# Patient Record
Sex: MALE | Race: OTHER | Employment: UNEMPLOYED | URBAN - NONMETROPOLITAN AREA
[De-identification: names, ages, dates, MRNs, and addresses within clinical notes are randomized per-mention and may not be internally consistent; named-entity substitution may affect disease eponyms.]

---

## 2017-10-14 ENCOUNTER — HOSPITAL ENCOUNTER (EMERGENCY)
Age: 37
Discharge: ANOTHER HOSPITAL | End: 2017-10-14
Attending: EMERGENCY MEDICINE
Payer: COMMERCIAL

## 2017-10-14 ENCOUNTER — HOSPITAL ENCOUNTER (INPATIENT)
Age: 37
LOS: 19 days | Discharge: PSYCHIATRIC OR SUBSTANCE USE FACILITY | DRG: 750 | End: 2017-11-02
Attending: PSYCHIATRY & NEUROLOGY | Admitting: PSYCHIATRY & NEUROLOGY
Payer: COMMERCIAL

## 2017-10-14 VITALS
SYSTOLIC BLOOD PRESSURE: 119 MMHG | RESPIRATION RATE: 16 BRPM | OXYGEN SATURATION: 99 % | DIASTOLIC BLOOD PRESSURE: 55 MMHG | TEMPERATURE: 98 F | HEART RATE: 80 BPM

## 2017-10-14 DIAGNOSIS — F20.0 PARANOID SCHIZOPHRENIA (HCC): Primary | ICD-10-CM

## 2017-10-14 LAB
-: NORMAL
ABSOLUTE EOS #: 0.1 K/UL (ref 0–0.4)
ABSOLUTE LYMPH #: 2.2 K/UL (ref 1–4.8)
ABSOLUTE MONO #: 1.1 K/UL (ref 0–1)
AMORPHOUS: NORMAL
AMPHETAMINE SCREEN URINE: NEGATIVE
ANION GAP SERPL CALCULATED.3IONS-SCNC: 11 MMOL/L (ref 9–17)
BACTERIA: NORMAL
BARBITURATE SCREEN URINE: NEGATIVE
BASOPHILS # BLD: 1 %
BASOPHILS ABSOLUTE: 0.1 K/UL (ref 0–0.2)
BENZODIAZEPINE SCREEN, URINE: NEGATIVE
BILIRUBIN URINE: NEGATIVE
BUN BLDV-MCNC: 7 MG/DL (ref 6–20)
BUN/CREAT BLD: 7 (ref 9–20)
BUPRENORPHINE URINE: NORMAL
CALCIUM SERPL-MCNC: 9.8 MG/DL (ref 8.6–10.4)
CANNABINOID SCREEN URINE: NEGATIVE
CASTS UA: NORMAL /LPF
CHLORIDE BLD-SCNC: 100 MMOL/L (ref 98–107)
CO2: 29 MMOL/L (ref 20–31)
COCAINE METABOLITE, URINE: NEGATIVE
COLOR: YELLOW
COMMENT UA: ABNORMAL
CREAT SERPL-MCNC: 1.06 MG/DL (ref 0.7–1.2)
CRYSTALS, UA: NORMAL /HPF
DIFFERENTIAL TYPE: YES
EKG ATRIAL RATE: 86 BPM
EKG ATRIAL RATE: 89 BPM
EKG P AXIS: 77 DEGREES
EKG P AXIS: 77 DEGREES
EKG P-R INTERVAL: 158 MS
EKG P-R INTERVAL: 162 MS
EKG Q-T INTERVAL: 358 MS
EKG Q-T INTERVAL: 362 MS
EKG QRS DURATION: 106 MS
EKG QRS DURATION: 96 MS
EKG QTC CALCULATION (BAZETT): 433 MS
EKG QTC CALCULATION (BAZETT): 435 MS
EKG R AXIS: 79 DEGREES
EKG R AXIS: 81 DEGREES
EKG T AXIS: 46 DEGREES
EKG T AXIS: 69 DEGREES
EKG VENTRICULAR RATE: 86 BPM
EKG VENTRICULAR RATE: 89 BPM
EOSINOPHILS RELATIVE PERCENT: 1 %
EPITHELIAL CELLS UA: NORMAL /HPF
ETHANOL PERCENT: <0.01 %
ETHANOL: <10 MG/DL
GFR AFRICAN AMERICAN: >60 ML/MIN
GFR NON-AFRICAN AMERICAN: >60 ML/MIN
GFR SERPL CREATININE-BSD FRML MDRD: ABNORMAL ML/MIN/{1.73_M2}
GFR SERPL CREATININE-BSD FRML MDRD: ABNORMAL ML/MIN/{1.73_M2}
GLUCOSE BLD-MCNC: 113 MG/DL (ref 70–99)
GLUCOSE URINE: NEGATIVE
HCT VFR BLD CALC: 45.8 % (ref 41–53)
HEMOGLOBIN: 15 G/DL (ref 13.5–17.5)
KETONES, URINE: ABNORMAL
LEUKOCYTE ESTERASE, URINE: NEGATIVE
LYMPHOCYTES # BLD: 20 %
MCH RBC QN AUTO: 28 PG (ref 26–34)
MCHC RBC AUTO-ENTMCNC: 32.7 G/DL (ref 31–37)
MCV RBC AUTO: 85.5 FL (ref 80–100)
MDMA URINE: NORMAL
METHADONE SCREEN, URINE: NEGATIVE
METHAMPHETAMINE, URINE: NEGATIVE
MONOCYTES # BLD: 10 %
MUCUS: NORMAL
NITRITE, URINE: NEGATIVE
OPIATES, URINE: NEGATIVE
OTHER OBSERVATIONS UA: NORMAL
OXYCODONE SCREEN URINE: NEGATIVE
PDW BLD-RTO: 12.9 % (ref 12.1–15.2)
PH UA: 6.5 (ref 5–8)
PHENCYCLIDINE, URINE: NEGATIVE
PLATELET # BLD: 293 K/UL (ref 140–450)
PLATELET ESTIMATE: ABNORMAL
PMV BLD AUTO: ABNORMAL FL (ref 6–12)
POTASSIUM SERPL-SCNC: 3.7 MMOL/L (ref 3.7–5.3)
PROPOXYPHENE, URINE: NEGATIVE
PROTEIN UA: NEGATIVE
RBC # BLD: 5.36 M/UL (ref 4.5–5.9)
RBC # BLD: ABNORMAL 10*6/UL
RBC UA: NORMAL /HPF (ref 0–2)
RENAL EPITHELIAL, UA: NORMAL /HPF
SEG NEUTROPHILS: 68 %
SEGMENTED NEUTROPHILS ABSOLUTE COUNT: 7.5 K/UL (ref 2.1–6.5)
SODIUM BLD-SCNC: 140 MMOL/L (ref 135–144)
SPECIFIC GRAVITY UA: 1.01 (ref 1–1.03)
TEST INFORMATION: NORMAL
TRICHOMONAS: NORMAL
TRICYCLIC ANTIDEPRESSANTS, UR: NEGATIVE
TURBIDITY: CLEAR
URINE HGB: NEGATIVE
UROBILINOGEN, URINE: ABNORMAL
WBC # BLD: 11 K/UL (ref 3.5–11)
WBC # BLD: ABNORMAL 10*3/UL
WBC UA: NORMAL /HPF
YEAST: NORMAL

## 2017-10-14 PROCEDURE — 80048 BASIC METABOLIC PNL TOTAL CA: CPT

## 2017-10-14 PROCEDURE — 99285 EMERGENCY DEPT VISIT HI MDM: CPT

## 2017-10-14 PROCEDURE — G0480 DRUG TEST DEF 1-7 CLASSES: HCPCS

## 2017-10-14 PROCEDURE — 93005 ELECTROCARDIOGRAM TRACING: CPT

## 2017-10-14 PROCEDURE — 81001 URINALYSIS AUTO W/SCOPE: CPT

## 2017-10-14 PROCEDURE — 1240000000 HC EMOTIONAL WELLNESS R&B

## 2017-10-14 PROCEDURE — 36415 COLL VENOUS BLD VENIPUNCTURE: CPT

## 2017-10-14 PROCEDURE — 6370000000 HC RX 637 (ALT 250 FOR IP): Performed by: PSYCHIATRY & NEUROLOGY

## 2017-10-14 PROCEDURE — 85025 COMPLETE CBC W/AUTO DIFF WBC: CPT

## 2017-10-14 PROCEDURE — 80306 DRUG TEST PRSMV INSTRMNT: CPT

## 2017-10-14 RX ORDER — HALOPERIDOL 5 MG
10 TABLET ORAL EVERY 4 HOURS PRN
Status: DISCONTINUED | OUTPATIENT
Start: 2017-10-14 | End: 2017-11-02 | Stop reason: HOSPADM

## 2017-10-14 RX ORDER — LORAZEPAM 2 MG/ML
1 INJECTION INTRAMUSCULAR ONCE
Status: DISCONTINUED | OUTPATIENT
Start: 2017-10-14 | End: 2017-10-14 | Stop reason: HOSPADM

## 2017-10-14 RX ORDER — HALOPERIDOL 5 MG/ML
10 INJECTION INTRAMUSCULAR EVERY 4 HOURS PRN
Status: DISCONTINUED | OUTPATIENT
Start: 2017-10-14 | End: 2017-11-02 | Stop reason: HOSPADM

## 2017-10-14 RX ORDER — MAGNESIUM HYDROXIDE/ALUMINUM HYDROXICE/SIMETHICONE 120; 1200; 1200 MG/30ML; MG/30ML; MG/30ML
30 SUSPENSION ORAL 3 TIMES DAILY PRN
Status: DISCONTINUED | OUTPATIENT
Start: 2017-10-14 | End: 2017-11-02 | Stop reason: HOSPADM

## 2017-10-14 RX ORDER — HYDROXYZINE HYDROCHLORIDE 25 MG/1
25 TABLET, FILM COATED ORAL 3 TIMES DAILY PRN
Status: DISCONTINUED | OUTPATIENT
Start: 2017-10-14 | End: 2017-11-02 | Stop reason: HOSPADM

## 2017-10-14 RX ORDER — TRAZODONE HYDROCHLORIDE 50 MG/1
50 TABLET ORAL NIGHTLY PRN
Status: DISCONTINUED | OUTPATIENT
Start: 2017-10-14 | End: 2017-11-02 | Stop reason: HOSPADM

## 2017-10-14 RX ORDER — BENZTROPINE MESYLATE 1 MG/ML
2 INJECTION INTRAMUSCULAR; INTRAVENOUS DAILY PRN
Status: DISCONTINUED | OUTPATIENT
Start: 2017-10-14 | End: 2017-11-02 | Stop reason: HOSPADM

## 2017-10-14 RX ORDER — ACETAMINOPHEN 325 MG/1
650 TABLET ORAL EVERY 4 HOURS PRN
Status: DISCONTINUED | OUTPATIENT
Start: 2017-10-14 | End: 2017-11-02 | Stop reason: HOSPADM

## 2017-10-14 RX ADMIN — HALOPERIDOL 10 MG: 5 TABLET ORAL at 21:00

## 2017-10-14 ASSESSMENT — SLEEP AND FATIGUE QUESTIONNAIRES
DO YOU HAVE DIFFICULTY SLEEPING: COMMENT
DO YOU USE A SLEEP AID: NO

## 2017-10-14 ASSESSMENT — ENCOUNTER SYMPTOMS
ABDOMINAL PAIN: 0
HYPERVENTILATION: 0

## 2017-10-14 ASSESSMENT — LIFESTYLE VARIABLES: HISTORY_ALCOHOL_USE: NO

## 2017-10-14 NOTE — ED NOTES
Roman Cosme from [facility] informs staff that patient has been accepted at [facility]. ETA for transportation is 1800.        Kay Rowan RN  10/14/17 2945

## 2017-10-14 NOTE — ED PROVIDER NOTES
eMERGENCY dEPARTMENT eNCOUnter        [facility]    Chief Complaint   Patient presents with   [address] Problem     pt. was brought in by [facility] PD after neighbors called stating pt. was screaming and yelling in his apartment. Pt. exhibiting hallucinations and paranoia. HPI    Sinan Tee is a 31 y.o. male who presents with hallucinations and Paranoia. Patient was brought in to the ER by [facility]. after neighbors called stating the patient was screaming and yelling in his apartment. REVIEW OF SYSTEMS    All systems reviewed and positives are in the history of present illness. PAST MEDICAL HISTORY    No past medical history on file. SURGICAL HISTORY    No past surgical history on file. CURRENT MEDICATIONS        ALLERGIES    No Known Allergies    FAMILY HISTORY    No family history on file. SOCIAL HISTORY    Social History     Social History    Marital status: Single     Spouse name: N/A    Number of children: N/A    Years of education: N/A     Social History Main Topics    Smoking status: Not on file    Smokeless tobacco: Not on file    Alcohol use Not on file    Drug use: Unknown    Sexual activity: Not on file     Other Topics Concern    Not on file     Social History Narrative    No narrative on file       PHYSICAL EXAM    VITAL SIGNS: BP (!) 119/55   Pulse 80   Temp 98 °F (36.7 °C) (Oral)   Resp 16   SpO2 99%   Constitutional:  Well developed, well nourished, Moderate distress due to anxiety and agitation. , non-toxic appearance   Eyes:  PERRL, conjunctiva normal   HENT:  Atraumatic, external ears normal, nose normal, oropharynx moist.  Dental decay present. Neck- supple   Respiratory:  No respiratory distress, normal breath sounds. Cardiovascular:  Normal rate, normal rhythm, no murmurs, no gallops, no rubs   GI:  Soft, nondistended, normal bowel sounds, nontender, no organomegaly.   Musculoskeletal:  No edema, no tenderness, no deformities. Back- no tenderness   Integument:  Well hydrated   Neurologic:  Grossly intact  Psychiatric:  Patient is very anxious and agitated    EKG    EKG with no acute changes. RADIOLOGY/PROCEDURES        ED COURSE & MEDICAL DECISION MAKING    Pertinent Labs & Imaging studies reviewed. (See chart for details)  Josseline saab evaluated patient in the ER, and [facility] will be transferred to [facility]. Dr. Caroline Fajardo accepted transfer. FINAL IMPRESSION    1. Schitzophrenia  2. Summation      Patient Course: 23 Lila Mack evaluated patient, and patient will be transferred to rescue at [facility]. Dr. Caroline Fajardo accepted transfer. ED Medications administered this visit:    Medications   LORazepam (ATIVAN) injection 1 mg (1 mg Intravenous Not Given 10/14/17 0524)       New Prescriptions from this visit:    New Prescriptions    No medications on file       Follow-up:  No follow-up provider specified. Final Impression:   1.  Paranoid schizophrenia ([facility] [address]               (Please note that portions of this note were completed with a voice recognition program.  Efforts were made to edit the dictations but occasionally words are mis-transcribed.)       Burgess Isac MD  10/14/17 9326

## 2017-10-14 NOTE — ED NOTES
Pt. States that he is has been to [facility] unit recently. Pt. States that he was given an Rx but did not fill it because he does not need it. Pt. States that he has the ability to predict future events before they happen. Pt. States that he is not crazy and that staff does not understand that \"just because he talks to people we can't see does not mean that they aren't there. Pt. States that we will not inject him with anything or take his urine because then he will be admitted. \" Pt. States that he would rather be taken to alf then kept at the hospital against his will.         Naif Rojas RN  10/14/17 5084

## 2017-10-14 NOTE — ED PROVIDER NOTES
The history is provided by the patient, the EMS personnel and the police. Mental Health Problem   Presenting symptoms: agitation, bizarre behavior and hallucinations    Presenting symptoms: no aggressive behavior, no delusions, no depression, no disorganized speech, no disorganized thought process, no homicidal ideas, no paranoid behavior, no self-mutilation, no suicidal thoughts, no suicidal threats and no suicide attempt    Patient accompanied by:  Law enforcement  Degree of incapacity (severity): Moderate  Onset quality:  Sudden  Duration:  1 hour  Timing:  Constant  Progression:  Unchanged  Chronicity:  New  Context: not alcohol use, not drug abuse, not medication, not noncompliant, not recent medication change and not stressful life event    Treatment compliance:  Untreated  Relieved by:  Nothing  Worsened by:  Nothing  Ineffective treatments:  None tried  Associated symptoms: no abdominal pain, no anhedonia, no anxiety, no appetite change, no chest pain, no decreased need for sleep, not distractible, no euphoric mood, no fatigue, no feelings of worthlessness, no headaches, no hypersomnia, no hyperventilation, no insomnia, no irritability, no poor judgment, no school problems and no weight change    Risk factors: hx of mental illness    Risk factors: no family hx of mental illness, no family violence, no hx of suicide attempts, no neurological disease and no recent psychiatric admission        Review of Systems   Unable to perform ROS: Psychiatric disorder   Constitutional: Negative for appetite change, fatigue and irritability. Cardiovascular: Negative for chest pain. Gastrointestinal: Negative for abdominal pain. Neurological: Negative for headaches. Psychiatric/Behavioral: Positive for agitation and hallucinations. Negative for homicidal ideas, paranoia, self-injury and suicidal ideas. The patient is not nervous/anxious and does not have insomnia.     All other systems reviewed and are negative. Physical Exam   Constitutional: He is oriented to person, place, and time. He appears well-developed and well-nourished. No distress. HENT:   Head: Normocephalic and atraumatic. Right Ear: External ear normal.   Left Ear: External ear normal.   Nose: Nose normal.   Mouth/Throat: Oropharynx is clear and moist.   Eyes: Conjunctivae and EOM are normal. Pupils are equal, round, and reactive to light. Right eye exhibits no discharge. Left eye exhibits no discharge. Neck: Normal range of motion. Neck supple. No JVD present. No tracheal deviation present. No thyromegaly present. Cardiovascular: Normal rate, regular rhythm, normal heart sounds and intact distal pulses. Exam reveals no gallop and no friction rub. No murmur heard. Pulmonary/Chest: Effort normal and breath sounds normal. No stridor. No respiratory distress. He has no wheezes. He has no rales. He exhibits no tenderness. Abdominal: Soft. Bowel sounds are normal. He exhibits no distension and no mass. There is no tenderness. There is no rebound and no guarding. Musculoskeletal: Normal range of motion. Lymphadenopathy:     He has no cervical adenopathy. Neurological: He is alert and oriented to person, place, and time. He has normal reflexes. He displays normal reflexes. No cranial nerve deficit. He exhibits normal muscle tone. Coordination normal.   Skin: Skin is warm. He is not diaphoretic. Psychiatric: He has a normal mood and affect. Nursing note and vitals reviewed. Procedures    MDM  Number of Diagnoses or Management Options  Diagnosis management comments: Patient brought in by police for agitation and admits to screaming at home in his apartment. He will be pink slipped as he is refusing an evaluation but may be a harm to self.  Patient siogned out to 8 am doctor       Amount and/or Complexity of Data Reviewed  Clinical lab tests: ordered and reviewed  Tests in the medicine section of CPT®: ordered and

## 2017-10-14 NOTE — ED NOTES
Pt. Restrained by Officer Latonia Martel and [facility] PD Officer María Couch for EKG and lab draw.      Piedad Kramer RN  10/14/17 3512

## 2017-10-15 PROBLEM — F20.0 PARANOID TYPE SCHIZOPHRENIA, CHRONIC STATE WITH ACUTE EXACERBATION (HCC): Status: ACTIVE | Noted: 2017-10-15

## 2017-10-15 PROCEDURE — 1240000000 HC EMOTIONAL WELLNESS R&B

## 2017-10-15 RX ORDER — OLANZAPINE 10 MG/1
10 TABLET ORAL NIGHTLY
Status: DISCONTINUED | OUTPATIENT
Start: 2017-10-15 | End: 2017-11-02 | Stop reason: HOSPADM

## 2017-10-15 ASSESSMENT — LIFESTYLE VARIABLES: HISTORY_ALCOHOL_USE: NO

## 2017-10-15 NOTE — PLAN OF CARE
Problem: Altered Mood, Manic Behavior  Goal: LTG-Mood stable  Outcome: Ongoing  Psycho Education Group Note    Date: 10/15/17  Start Time: 1430  End Time: 1530    Number Participants in Group:  10    Goal:  Patient will demonstrate increased interpersonal interaction   Topic: [facility]o Education Group    Discipline Responsible:   OT  AT X SW  Nsg.  RT  Other       Participation Level:     None  Minimal   x Active Listener x Interactive    Monopolizing         Participation Quality:  x Appropriate  Inappropriate   x       Attentive        Intrusive   x       Sharing        Resistant   x       Supportive        Lethargic       Affective:   x Congruent  Incongruent  Blunted  Flat    Constricted  Anxious  Elated  Angry    Labile  Depressed  Other         Cognitive:  x Alert x Oriented PPTP     Concentration x G  F  P   Attention Span x G  F  P   Short-Term Memory x G  F  P   Long-Term Memory x G  F  P   ProblemSolving/  Decision Making x G  F  P   Ability to Process  Information x G  F  P      Contributing Factors             Delusional             Hallucinating             Flight of Ideas             Other:       Modes of Intervention:   Education x Support x Exploration    Clarifying  Problem Solving  Confrontation   x Socialization  Limit Setting  Reality Testing   x Activity  Movement  Media    Other:            Response to Learning:  x Able to verbalize current knowledge/experience    Able to verbalize/acknowledge new learning    Able to retain information   x Capable of insight    Able to change behavior    Progressing to goal    Other:        Comments:

## 2017-10-15 NOTE — PROGRESS NOTES
Seclusion       No contraindication     Restraints      No contraindications    Psychiatric evaluation: This is a 19-year-old white male patient who was [facility] was brought with application for emergency admission. He was initially taken by police to the emergency room because he was screaming his apartment and was quite bizarre agitated and paranoid claiming that his mother hard hired a hit man to kill him and he that he was talking to angels. He is not able to provide much reliable information at this time as he keeps repeating that there is nothing wrong with him and that he does not have a mental illness. He has history of couple of psychiatric hospitalizations but poor compliance with treatment. He has history of alcohol abuse but states that he hasn't been drinking for last 6 years. He states that he needs some dental work done but denies any medical problems. He reports that his uncle has history of psychiatric problems. He reports that he was born and raised in [address] [place] [address] and graduated from high school. He states that he has had sporadic jobs in the past but none for last 1-1/2 years. He is single and does not have any children. He lives alone. On mental status examination He is of average height and built, uncooperative and poorly informative as he is very guarded paranoid delusional and out of contact with reality. His thought process is poorly productive and disorganized, preoccupied with the delusions. He has auditory hallucinations as he claims that he talks to angels. He is alert but his orientation memory or intellectual functions cannot be evaluated because of poverty of thought and lack of cooperation. He has poor judgment and no insight. Diagnostic impression  : Schizophrenia paranoid type    Treatment plan: Medication management, supportive therapy and hospital milieu. Side effects of medications reviewed and medication education provided.     ELOS:5-7 days
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
## 2017-10-15 NOTE — PLAN OF CARE
Problem: Altered Mood, Psychotic Behavior  Goal: LTG-Able to verbalize reality based thinking  Outcome: Ongoing  Psychoeducation Group Note    Date: 10/15/17  Start Time: 1330  End Time: 1415    Number Participants in Group:  9    Goal:  Patient will demonstrate increased interpersonal interaction.    Topic:  Therapeutic Leisure Skills Group    Discipline Responsible:   OT  AT  [facility]. X RT  Other       Participation Level:     None  Minimal   X Active Listener X Interactive    Monopolizing         Participation Quality:  X Appropriate  Inappropriate   X       Attentive        Intrusive   X       Sharing        Resistant   X       Supportive        Lethargic       Affective:   X Congruent  Incongruent  Blunted  Flat    Constricted  Anxious  Elated  Angry    Labile  Depressed  Other  Bright       Cognitive:  X Alert X Oriented PPTP     Concentration X G  F  P   Attention Span X G  F  P   Short-Term Memory X G  F  P   Long-Term Memory X G  F  P   ProblemSolving/  Decision Making X G  F  P   Ability to Process  Information X G  F  P      Contributing Factors             Delusional             Hallucinating             Flight of Ideas             Other:       Modes of Intervention:  X Education X Support X Exploration   X Clarifying X Problem Solving  Confrontation   X Socialization X Limit Setting  Reality Testing   X Activity  Movement  Media    Other:            Response to Learning:  X Able to verbalize current knowledge/experience   X Able to verbalize/acknowledge new learning   X Able to retain information   X Capable of insight    Able to change behavior   X Progressing to goal    Other:        Comments:

## 2017-10-15 NOTE — PLAN OF CARE
Problem: Altered Mood, Manic Behavior  Goal: LTG-Mood stable  Outcome: Ongoing  [facility]  Initial Interdisciplinary Treatment Plan NOTE    Original treatment plan Date & Time: 10/15/17 0810    Admission Type:  Admission Type: Inpatient    Reason for admission:   Reason for Admission: Police were called to pt's house because he was screaming in his apartment in the middle of the night, pt states he is talking to the angels, pt also talking to Tasha Reza which is his mother, PT is responding  to internal stimuli, paranoid , religiously preoccupied, pt believes his mother is hiring hit men to kill him. Estimated Length of Stay:  5-7days  Estimated Discharge Date:  10/21/17 to be determined by physician    PATIENT STRENGTHS:  Patient Strengths:Strengths: Positive Support, No significant Physical Illness  Patient Strengths and Limitations:Limitations: Tendency to isolate self (\"med compliance\")  Addictive Behavior: Addictive Behavior  In the past 3 months, have you felt or has someone told you that you have a problem with:  : None  Do you have a history of Chemical Use?: No  Do you have a history of Alcohol Use?: No  Do you have a history of Street Drug Abuse?: No  Histroy of Prescripton Drug Abuse?: No  Medical Problems:History reviewed. No pertinent past medical history. Status EXAM:Status and Exam  Normal: No  Facial Expression: Avoids Gaze, Expressionless  Affect: Unstable  Level of Consciousness: Alert  Mood:Normal: No  Mood: Anxious, Suspicious  Motor Activity:Normal: No  Motor Activity: Increased, Agitated, Repetitive Acts  Interview Behavior: Impulsive  Preception: Goodspring to Person, Goodspring to Time  Attention:Normal: No  Attention: Distractible, Unable to Concentrate  Thought Processes: Flt.of Ideas, Loose Assoc. Thought Content:Normal: No  Thought Content: Delusions, Paranoia, Preoccupations, Poverty of Content  Hallucinations:  Auditory (Comment), Command(Comment), Visual (Comment)  Delusions:

## 2017-10-16 PROCEDURE — 1240000000 HC EMOTIONAL WELLNESS R&B

## 2017-10-16 NOTE — PLAN OF CARE
Problem: Altered Mood, Manic Behavior  Goal: LTG-Mood stable  Outcome: Ongoing  Pt did not participate in Relapse Prevention Recovery Group at 1430 despite staff encouragement.

## 2017-10-16 NOTE — PLAN OF CARE
Problem: Altered Mood, Psychotic Behavior  Goal: LTG-Able to verbalize reality based thinking  Outcome: Ongoing  Psychoeducation Group Note    Date: 10/16/17  Start Time: 1330  End Time: 1415    Number Participants in Group:  6    Goal:  Patient will demonstrate increased interpersonal interaction. Topic:  Cognitive skills    Discipline Responsible:   OT  AT  [facility]. X RT  Other       Participation Level:     None  Minimal   x Active Listener x Interactive    Monopolizing         Participation Quality:  x Appropriate  Inappropriate   x       Attentive        Intrusive   x       Sharing        Resistant          Supportive        Lethargic       Affective:   x Congruent  Incongruent  Blunted  Flat    Constricted  Anxious  Elated  Angry    Labile  Depressed  Other  Bright       Cognitive:  x Alert x Oriented PPTP     Concentration x G  F  P   Attention Span x G  F  P   Short-Term Memory  G  F  P   Long-Term Memory x G  F  P   ProblemSolving/  Decision Making x G  F  P   Ability to Process  Information x G  F  P      Contributing Factors             Delusional             Hallucinating             Flight of Ideas             Other:       Modes of Intervention:   Education x Support  Exploration   x Clarifying x Problem Solving x Confrontation   x Socialization  Limit Setting  Reality Testing    Activity  Movement  Media    Other:            Response to Learning:  x Able to verbalize current knowledge/experience   x Able to verbalize/acknowledge new learning    Able to retain information    Capable of insight   x Able to change behavior   x Progressing to goal    Other:        Comments: pt attended group and participated.
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
## 2017-10-16 NOTE — PLAN OF CARE
Problem: Altered Mood, Manic Behavior  Goal: LTG-Mood stable  Outcome: Ongoing  Patient is calm and controlled but is refusing medications. Patient denies suicidal ideations but appears flat and anxious. Patient is fearful that \"gangs will attack [place] in his hometown\". Patient reports auditory voices that \"come and go\" and reports eating and sleeping well with safety checks Q15min and at irregular intervals. Patient attends groups and is polite with staff.   Goal: STG-Medication therapy compliance  Outcome: Ongoing      Problem: Altered Mood, Psychotic Behavior  Goal: LTG-Able to verbalize reality based thinking  Outcome: Ongoing

## 2017-10-16 NOTE — PLAN OF CARE
Problem: Altered Mood, Manic Behavior  Goal: LTG-Mood stable  Outcome: Ongoing  PSYCHOTHERAPY GROUP NOTE    Date: 10/16/17  Start Time: 10AM  End Time: 10:45AM    Number Participants in Group:  4     Goal:  Patient will demonstrate increased interpersonal interaction   Topic: Support     Discipline Responsible:   OT  AT x SW  Nsg.  RT MHP Other       Participation Level:     None x Minimal    Active Listener  Interactive    Monopolizing         Participation Quality:   Appropriate  Inappropriate          Attentive        Intrusive          Sharing        Resistant          Supportive x       Lethargic       Affective:   x Congruent  Incongruent  Blunted  Flat    Constricted  Anxious  Elated  Angry    Labile  Depressed  Other         Cognitive:   Alert x Oriented PPTP     Concentration  G  F x P   Attention Span  G  F x P   Short-Term Memory  G  F x P   Long-Term Memory  G  F x P   ProblemSolving/  Decision Making  G  F x P   Ability to Process  Information  G  F x P      Contributing Factors             Delusional             Hallucinating             Flight of Ideas             Other:       Modes of Intervention:   Education  Support  Exploration    Clarifying  Problem Solving  Confrontation    Socialization  Limit Setting  Reality Testing    Activity  Movement  Media    Other:            Response to Learning:   Able to verbalize current knowledge/experience    Able to verbalize/acknowledge new learning    Able to retain information    Capable of insight    Able to change behavior    Progressing to goal   x Other:        Comments:   Pt participated at times but had difficulty with staying awake- pt stated he had received medications that were causing him to be drowsy.  Pt declined leaving group and stated he needed to remain in group \"so I can get out of her sooner\"

## 2017-10-16 NOTE — PLAN OF CARE
Problem: Altered Mood, Manic Behavior  Goal: LTG-Mood stable  Outcome: Ongoing  Psychoeducation Group Note    Date: 10/16/17  Start Time: 0845  End Time: 0915    Number Participants in Group:  8    Goal:  Patient will demonstrate increased interpersonal interaction. Topic:  Community meeting/goals    Discipline Responsible:   OT  AT  [facility]. X RT  Other       Participation Level:     None  Minimal   x Active Listener  Interactive    Monopolizing         Participation Quality:  x Appropriate  Inappropriate   x       Attentive        Intrusive          Sharing        Resistant          Supportive        Lethargic       Affective:   x Congruent  Incongruent  Blunted  Flat    Constricted  Anxious  Elated  Angry    Labile  Depressed  Other  Bright       Cognitive:  x Alert x Oriented PPTP     Concentration x G  F  P   Attention Span x G  F  P   Short-Term Memory  G  F  P   Long-Term Memory  G  F  P   ProblemSolving/  Decision Making  G x F  P   Ability to Process  Information  G x F  P      Contributing Factors             Delusional             Hallucinating             Flight of Ideas             Other:       Modes of Intervention:   Education  Support  Exploration    Clarifying x Problem Solving x Confrontation   x Socialization  Limit Setting  Reality Testing    Activity  Movement  Media    Other:            Response to Learning:  x Able to verbalize current knowledge/experience   x Able to verbalize/acknowledge new learning    Able to retain information    Capable of insight   x Able to change behavior   x Progressing to goal    Other:        Comments: pt attended group and participated.

## 2017-10-16 NOTE — PLAN OF CARE
Problem: Altered Mood, Psychotic Behavior  Goal: LTG-Able to verbalize reality based thinking  Outcome: Ongoing  Psychoeducation Group Note    Date: 10/16/17  Start Time: 1100  End Time: 1200    Number Participants in Group:  5    Goal:  Patient will demonstrate increased interpersonal interaction. Topic:  Therapeutic Leisure Skills Group    Discipline Responsible:   OT  AT  [facility]. X RT  Other       Participation Level:     None  Minimal   X Active Listener X Interactive    Monopolizing         Participation Quality:  X Appropriate  Inappropriate   X       Attentive        Intrusive          Sharing        Resistant          Supportive        Lethargic       Affective:    Congruent  Incongruent  Blunted  Flat   X Constricted  Anxious  Elated  Angry    Labile  Depressed  Other  Bright       Cognitive:  X Alert X Oriented PPTP     Concentration  G X F  P   Attention Span  G X F  P   Short-Term Memory  G X F  P   Long-Term Memory  G X F  P   ProblemSolving/  Decision Making  G X F  P   Ability to Process  Information  G X F  P      Contributing Factors             Delusional             Hallucinating             Flight of Ideas             Other:       Modes of Intervention:  X Education X Support X Exploration   X Clarifying X Problem Solving  Confrontation   X Socialization X Limit Setting  Reality Testing   X Activity  Movement  Media    Other:            Response to Learning:  X Able to verbalize current knowledge/experience   X Able to verbalize/acknowledge new learning   X Able to retain information   X Capable of insight    Able to change behavior   X Progressing to goal    Other:        Comments: Pt required support and assistance to participate in group.

## 2017-10-16 NOTE — PLAN OF CARE
Problem: Altered Mood, Manic Behavior  Goal: LTG-Mood stable  Outcome: Ongoing  [facility]  Day 3 Interdisciplinary Treatment Plan NOTE    Review Date & Time: 10/16/17 0933    Admission Type:   Admission Type: Inpatient    Reason for admission:  Reason for Admission: Police were called to pt's house because he was screaming in his apartment in the middle of the night, pt states he is talking to the angels, pt also talking to Aixa Ramírezman which is his mother, PT is responding  to internal stimuli, paranoid , religiously preoccupied, pt believes his mother is hiring hit men to kill him. Estimated Length of Stay:  5-7 days  Estimated Discharge Date Update:  10/23/17 to be determined by physician    PATIENT STRENGTHS:  Patient Strengths:Strengths: No significant Physical Illness, Communication  Patient Strengths and Limitations:Limitations: Unrealistic self-view, Tendency to isolate self, Lacks leisure interests, Apathetic / unmotivated, Difficulty problem solving/relies on others to help solve problems, Difficult relationships / poor social skills  Addictive Behavior:Addictive Behavior  In the past 3 months, have you felt or has someone told you that you have a problem with:  : None  Do you have a history of Chemical Use?: No  Do you have a history of Alcohol Use?: No (PT stated the last time he drank was 5-6 years ago)  Do you have a history of Street Drug Abuse?: No  Histroy of Prescripton Drug Abuse?: No  Medical Problems:History reviewed. No pertinent past medical history.     Risk:  Fall RiskTotal: 67  Amadeo Scale Amadeo Scale Score: 22  BVC Total: 0  Change in scores:  No Changes to plan of Care:  No    Status EXAM:   Status and Exam  Normal: No  Facial Expression: Avoids Gaze  Affect: Appropriate  Level of Consciousness: Alert  Mood:Normal: No  Mood: Anxious, Suspicious  Motor Activity:Normal: No  Motor Activity: Increased, Repetitive Acts  Interview Behavior: Cooperative  Preception: [place] to discharge goals, continue with medication compliance    SHORT-TERM GOALS UPDATE:   Time frame for Short-Term Goals:  5-7 days    LONG-TERM GOALS UPDATE:   Time frame for Long-Term Goals:  6 months    Members Present in Team Meeting:     VIVIANA Paredes      Problem: Altered Mood, Psychotic Behavior  Goal: STG-Able to demonstrate trust by eating, participating in treatment and following staff's directions  Outcome: Ongoing    Goal: LTG-Able to verbalize reality based thinking  Outcome: Ongoing

## 2017-10-16 NOTE — PROGRESS NOTES
Hecontinues to be very psychotic delusional paranoid and interacting with internal stimuli. Getting very agitated . Affect-Superficial  Mood -  Agitated and labile  Thought process -  Disorganized showing looseness of association and tangentiality. Reality testing-Impaired  Cognition -  Impaired  Memory- Recent-Impaired                Remote-Impaired  Orientation-Disoriented                                              Insight-Poor  Judgement-Poor    Attempted to challenge his delusions. Attempted to develop insight and improve reality testing  Medications reviewed. Side effects of medications reviewed and medication education provided  No side effects including akathisia,tremers,dystonia or orthostasis reported or observed. Plan: Stabilization with antipsychotic and mood stabilization medications,group therapy and develop  Coping skills,discharge to community. Encouraged to interact with peers  and participate in activities. Canary Malady

## 2017-10-17 PROCEDURE — 1240000000 HC EMOTIONAL WELLNESS R&B

## 2017-10-17 NOTE — PLAN OF CARE
Problem: Altered Mood, Manic Behavior  Goal: LTG-Mood stable  Outcome: Ongoing  Psychoeducation Group Note    Date: 10/17/17  Start Time: 0845  End Time: 6734    Number Participants in Group:  7    Goal:  Patient will demonstrate increased interpersonal interaction. Topic:  Goal Setting and [employer]    Discipline Responsible:   OT  AT  [facility]. X RT  Other       Participation Level:     None  Minimal   X Active Listener X Interactive    Monopolizing         Participation Quality:  X Appropriate  Inappropriate   X       Attentive        Intrusive   X       Sharing        Resistant   X       Supportive        Lethargic       Affective:   X Congruent  Incongruent  Blunted  Flat    Constricted  Anxious  Elated  Angry    Labile  Depressed  Other  Bright       Cognitive:  X Alert X Oriented PPTP     Concentration X G  F  P   Attention Span X G  F  P   Short-Term Memory X G  F  P   Long-Term Memory X G  F  P   ProblemSolving/  Decision Making X G  F  P   Ability to Process  Information X G  F  P      Contributing Factors             Delusional             Hallucinating             Flight of Ideas             Other:       Modes of Intervention:  X Education X Support X Exploration   X Clarifying X Problem Solving X Confrontation   X Socialization X Limit Setting  Reality Testing   X Activity  Movement  Media    Other:            Response to Learning:  X Able to verbalize current knowledge/experience   X Able to verbalize/acknowledge new learning   X Able to retain information   X Capable of insight    Able to change behavior   X Progressing to goal    Other:        Comments: Pt developed daily goal to go to groups, exercise, stress management, exercise.

## 2017-10-17 NOTE — PLAN OF CARE
Problem: Altered Mood, Manic Behavior  Goal: LTG-Mood stable  Outcome: Ongoing  Pt appears anxious at times but remains controlled and cooperative this shift    Problem: Altered Mood, Psychotic Behavior  Goal: STG-Able to demonstrate trust by eating, participating in treatment and following staff's directions  Outcome: Ongoing  Pt is controlled and cooperative this shift. He sits in the fringe of the milieu aloof of staff and peers but is cooperative when approached. He denies SI, HI, and hallucinations at this time but appears preoccupied.  He accepts a snack, but refuses medication \"I do not need medication\"

## 2017-10-17 NOTE — PLAN OF CARE
Problem: Altered Mood, Psychotic Behavior  Goal: LTG-Able to verbalize reality based thinking  Outcome: Ongoing  1:1 with pt x ten minutes. Pt encouraged to attend unit programming and interact with peers and staff. Pt also encouraged to tend to hygiene and ADLs. Pt encouraged to discuss feelings with staff and feedback and reassurance provided. Pt denies thoughts of self harm and is agreeable to seeking out should thoughts of self harm arise. Safe environment maintained. Q15 minute checks for safety cont per unit policy. Will cont to monitor for safety and provides support and reassurance as needed. Pt lacks insight into illness or need for medications. Pt does attend groups with some participation. paranoid and suspious with peers and staff.

## 2017-10-18 PROCEDURE — 1240000000 HC EMOTIONAL WELLNESS R&B

## 2017-10-18 NOTE — BH NOTE
Psychoeducation Group Note    Date: 10/17/2017  Start Time: 2030  End Time: 2100    Number Participants in Group:  8    Goal:  Patient will demonstrate increased interpersonal interaction   Topic: Wrap up and Relaxation    Discipline Responsible:   OT  AT  [facility].  RT BHT2 Other       Participation Level:     None  Minimal   x Active Listener x Interactive    Monopolizing         Participation Quality:  x Appropriate  Inappropriate   x       Attentive        Intrusive   x       Sharing        Resistant   x       Supportive        Lethargic       Affective:   x Congruent  Incongruent  Blunted  Flat    Constricted  Anxious  Elated  Angry    Labile  Depressed  Other         Cognitive:  x Alert x Oriented PPTP     Concentration x G  F  P   Attention Span x G  F  P   Short-Term Memory x G  F  P   Long-Term Memory x G  F  P   ProblemSolving/  Decision Making x G  F  P   Ability to Process  Information x G  F  P      Contributing Factors             Delusional             Hallucinating             Flight of Ideas             Other:       Modes of Intervention:   Education  Support x Exploration    Clarifying  Problem Solving  Confrontation   x Socialization  Limit Setting  Reality Testing   x Activity  Movement  Media    Other:            Response to Learning:  x Able to verbalize current knowledge/experience   x Able to verbalize/acknowledge new learning   x Able to retain information   x Capable of insight   x Able to change behavior   x Progressing to goal    Other:        Comments:   Pt was an active participant in group, responding and sharing when appropriate.

## 2017-10-18 NOTE — PLAN OF CARE
Problem: Altered Mood, Manic Behavior  Goal: LTG-Mood stable  Outcome: Ongoing  Psychoeducation Group Note    Date: 10/18/2017  Start Time: 1615  End Time: 0920    Number Participants in Group:  10    Goal:  Patient will demonstrate increased interpersonal interaction. Topic:  Goal Setting / Community Meeting    Discipline Responsible:   OT  AT  [facility]. X RT  Other       Participation Level:     None  Minimal   x Active Listener x Interactive    Monopolizing         Participation Quality:  x Appropriate  Inappropriate   x       Attentive        Intrusive   x       Sharing        Resistant          Supportive        Lethargic       Affective:    Congruent  Incongruent  Blunted x Flat    Constricted  Anxious  Elated  Angry    Labile  Depressed  Other  Bright       Cognitive:  x Alert x Oriented PPTP     Concentration x G  F  P   Attention Span x G  F  P   Short-Term Memory x G  F  P   Long-Term Memory  G  F  P   ProblemSolving/  Decision Making x G  F  P   Ability to Process  Information x G  F  P      Contributing Factors             Delusional             Hallucinating             Flight of Ideas             Other:        Modes of Intervention:  x Education x Support x Exploration   x Clarifying x Problem Solving x Confrontation   x Socialization x Limit Setting x Reality Testing   x Activity  Movement  Media    Other:            Response to Learning:  x Able to verbalize current knowledge/experience   x Able to verbalize/acknowledge new learning   x Able to retain information   x Capable of insight    Able to change behavior   x Progressing to goal    Other:        Goal for today: Attend groups. Improve coping skills.

## 2017-10-18 NOTE — PLAN OF CARE
Problem: Altered Mood, Manic Behavior  Goal: LTG-Mood stable  Outcome: Ongoing  Psychoeducation Group Note    Date: 10/18/17  Start Time: 1430  End Time: 4369    Number Participants in Group:  6    Goal:  Patient will demonstrate increased interpersonal interaction. Topic:  Walking/stretching    Discipline Responsible:   OT  AT  [facility]. X RT  Other       Participation Level:     None  Minimal   x Active Listener x Interactive    Monopolizing         Participation Quality:  x Appropriate  Inappropriate   x       Attentive        Intrusive   x       Sharing        Resistant          Supportive        Lethargic       Affective:   x Congruent  Incongruent  Blunted  Flat    Constricted  Anxious  Elated  Angry    Labile  Depressed  Other  Bright       Cognitive:  x Alert x Oriented PPTP     Concentration x G  F  P   Attention Span x G  F  P   Short-Term Memory  G  F  P   Long-Term Memory  G  F  P   ProblemSolving/  Decision Making x G  F  P   Ability to Process  Information x G  F  P      Contributing Factors             Delusional             Hallucinating             Flight of Ideas             Other:       Modes of Intervention:   Education  Support  Exploration   x Clarifying x Problem Solving  Confrontation   x Socialization  Limit Setting  Reality Testing    Activity x Movement x Media    Other:            Response to Learning:  x Able to verbalize current knowledge/experience   x Able to verbalize/acknowledge new learning    Able to retain information    Capable of insight   x Able to change behavior   x Progressing to goal    Other:        Comments: pt attended group and participated.

## 2017-10-19 PROCEDURE — 1240000000 HC EMOTIONAL WELLNESS R&B

## 2017-10-19 NOTE — PLAN OF CARE
Problem: Altered Mood, Psychotic Behavior  Goal: LTG-Able to verbalize reality based thinking  Outcome: Ongoing  Psychoeducation Group Note    Date: 10/19/17  Start Time: 0845  End Time: 6147    Number Participants in Group:  9    Goal:  Patient will demonstrate increased interpersonal interaction. Topic:  Community meeting/goals    Discipline Responsible:   OT  AT  [facility]. X RT  Other       Participation Level:     None  Minimal   x Active Listener x Interactive    Monopolizing         Participation Quality:  x Appropriate  Inappropriate   x       Attentive        Intrusive          Sharing        Resistant          Supportive        Lethargic       Affective:   x Congruent  Incongruent  Blunted  Flat    Constricted  Anxious  Elated  Angry    Labile  Depressed  Other  Bright       Cognitive:  x Alert x Oriented PPTP     Concentration x G  F  P   Attention Span x G  F  P   Short-Term Memory  G  F  P   Long-Term Memory  G  F  P   ProblemSolving/  Decision Making  G x F  P   Ability to Process  Information  G x F  P      Contributing Factors             Delusional             Hallucinating             Flight of Ideas             Other:       Modes of Intervention:   Education x Support  Exploration   x Clarifying x Problem Solving x Confrontation   x Socialization  Limit Setting  Reality Testing    Activity  Movement  Media    Other:            Response to Learning:  x Able to verbalize current knowledge/experience   x Able to verbalize/acknowledge new learning    Able to retain information    Capable of insight   x Able to change behavior   x Progressing to goal    Other:        Comments: pt attended group and participated.

## 2017-10-19 NOTE — PLAN OF CARE
Problem: Altered Mood, Psychotic Behavior  Goal: LTG-Able to verbalize reality based thinking  Outcome: Ongoing  Psychoeducation Group Note    Date: 10/19/2017  Start Time: 1100  End Time: 5641    Number Participants in Group:  8    Goal:  Patient will demonstrate increased interpersonal interaction.    Topic:  Creative Expression / Stress and Relaxation / Coping Skills    Discipline Responsible:   OT  AT  [facility]. X RT  Other       Participation Level:     None  Minimal   x Active Listener x Interactive    Monopolizing         Participation Quality:  x Appropriate  Inappropriate   x       Attentive        Intrusive   x       Sharing        Resistant          Supportive        Lethargic       Affective:   x Congruent  Incongruent  Blunted  Flat    Constricted  Anxious  Elated  Angry    Labile  Depressed  Other  Bright       Cognitive:  x Alert x Oriented PPTP     Concentration x G  F  P   Attention Span x G  F  P   Short-Term Memory x G  F  P   Long-Term Memory  G  F  P   ProblemSolving/  Decision Making x G  F  P   Ability to Process  Information x G  F  P      Contributing Factors             Delusional             Hallucinating             Flight of Ideas             Other:       Modes of Intervention:  x Education x Support x Exploration   x Clarifying x Problem Solving x Confrontation   x Socialization x Limit Setting x Reality Testing   x Activity  Movement  Media    Other:            Response to Learning:  x Able to verbalize current knowledge/experience   x Able to verbalize/acknowledge new learning   x Able to retain information   x Capable of insight    Able to change behavior   x Progressing to goal    Other:        Comments:

## 2017-10-19 NOTE — PLAN OF CARE
Problem: Altered Mood, Manic Behavior  Goal: LTG-Mood stable  Outcome: Ongoing  PSYCHOTHERAPY GROUP NOTE    Date: 10/19/17  Start Time: 10 am  End Time: 10:45a    Number Participants in Group:  8    Goal:  Patient will demonstrate increased interpersonal interaction   Topic: Support     Discipline Responsible:   OT  AT x SW  Nsg.  RT MHP Other       Participation Level:     None x Minimal    Active Listener  Interactive    Monopolizing         Participation Quality:   Appropriate  Inappropriate          Attentive        Intrusive          Sharing        Resistant   x       Supportive        Lethargic       Affective:   x Congruent  Incongruent  Blunted  Flat    Constricted  Anxious  Elated  Angry    Labile  Depressed  Other         Cognitive:  x Alert x Oriented PPTP     Concentration  G x F  P   Attention Span  G x F  P   Short-Term Memory  G x F  P   Long-Term Memory  G x F  P   ProblemSolving/  Decision Making  G x F  P   Ability to Process  Information  G x F  P      Contributing Factors             Delusional             Hallucinating             Flight of Ideas             Other:       Modes of Intervention:   Education x Support  Exploration    Clarifying  Problem Solving  Confrontation    Socialization  Limit Setting  Reality Testing    Activity  Movement  Media    Other:            Response to Learning:   Able to verbalize current knowledge/experience    Able to verbalize/acknowledge new learning    Able to retain information   x Capable of insight    Able to change behavior    Progressing to goal    Other:        Comments:

## 2017-10-19 NOTE — PROGRESS NOTES
Patient is quite  psychotic, agitated, paranoid, and guarded. Overwhelmed with persecutory delusions and hallucinationsStill responding to internal stimuli but still refusing to take medications . Affect-Superficial  Mood -  Agitated and labile  Thought process -  Disorganized showing looseness of association and tangentiality. Reality testing-Impaired  Cognition -  Impaired  Memory- Recent-Impaired                Remote-Impaired  Orientation-Disoriented                                              Insight-Poor  Judgement-Poor                                                Attempted to develop insight. Medications reviewed. Side effects of medications reviewed and medication education provided. No side effects including akathisia,tremers,dystonia or orthostasis reported or observed. Plan: Probate hearing for forced medication at [facility].  In the meantime continue to convince him to take medications and Stabilization with antipsychotic and mood stabilization medications,group therapy and develop coping skills,discharge to community. Sandra Haynes

## 2017-10-20 PROCEDURE — 1240000000 HC EMOTIONAL WELLNESS R&B

## 2017-10-20 NOTE — PLAN OF CARE
Problem: Altered Mood, Manic Behavior  Goal: LTG-Mood stable  Outcome: Ongoing  Therapeutic Recreation Refusal  Pt did not participate in community meeting/goals at [address] [place] [address] despite staff encouragement.

## 2017-10-20 NOTE — PLAN OF CARE
Problem: Altered Mood, Psychotic Behavior  Goal: STG-Able to demonstrate trust by eating, participating in treatment and following staff's directions  Outcome: Ongoing  Pt out for meals and needs only. Pt remains aloof of peers and staff. Pt paces the kohli often or isolates to room. Pt is calm and cooperative upon approach. Pt. Remains on q15 min checks and frequent spontaneous checks throughout shift. Pt. Safety maintained.

## 2017-10-21 PROCEDURE — 1240000000 HC EMOTIONAL WELLNESS R&B

## 2017-10-21 NOTE — PROGRESS NOTES
Problem: Altered Mood, Manic Behavior  Goal: LTG-Mood stable  Outcome: Ongoing  Pt appears anxious at times but remains controlled and cooperative this shift     Problem: Altered Mood, Psychotic Behavior  Goal: STG-Able to demonstrate trust by eating, participating in treatment and following staff's directions  Outcome: Ongoing  Pt is controlled and cooperative this shift. He sits in the fringe of the milieu aloof of staff and peers but is cooperative when approached. Keyla Martinez denies SI, HI, and hallucinations at this time but appears preoccupied and paranoid at times.  He accepts a snack, but refuses medication \"I do not need medication, they will have to force it on me\"

## 2017-10-21 NOTE — PLAN OF CARE
Problem: Altered Mood, Manic Behavior  Goal: LTG-Mood stable  Outcome: Ongoing  Pt encouraged to explore coping skills for reducing anxiety,PT need much encouragement; spent most of his day in his room or hallway; Patient remain fearful and paranoid. Denies SI/HI at present time. 15 MIN safety checks maintained.

## 2017-10-22 PROCEDURE — 1240000000 HC EMOTIONAL WELLNESS R&B

## 2017-10-22 NOTE — PLAN OF CARE
Problem: Altered Mood, Psychotic Behavior  Goal: LTG-Able to verbalize reality based thinking  He is reality based but very guarded/non-relating of issues.

## 2017-10-22 NOTE — BH NOTE
Psychoeducation Group Note    Date: 10/22/17 Start Time: 1100  End Time: 1506    Number Participants in Group:  20    Goal:  Patient will demonstrate increased interpersonal interaction   Topic: Safety Group    Discipline Responsible:   OT  AT   X Nsg.  RT  Other       Participation Level:     None  Minimal   X Active Listener  Interactive    Monopolizing         Participation Quality:  X Appropriate  Inappropriate          Attentive        Intrusive          Sharing        Resistant          Supportive        Lethargic       Affective:   X Congruent  Incongruent  Blunted  Flat    Constricted  Anxious  Elated  Angry    Labile  Depressed  Other         Cognitive:  X Alert  Oriented PPTP     Concentration  G X F  P   Attention Span  G X F  P   Short-Term Memory  G X F  P   Long-Term Memory  G X F  P   ProblemSolving/  Decision Making  G X F  P   Ability to Process  Information  G X F  P      Contributing Factors             Delusional             Hallucinating             Flight of Ideas             Other:       Modes of Intervention:   Education  Support  Exploration    Clarifying  Problem Solving  Confrontation    Socialization  Limit Setting  Reality Testing    Activity  Movement  Media   X Other: Safety  X Room checks         Response to Learning:  X Able to verbalize current knowledge/experience    Able to verbalize/acknowledge new learning    Able to retain information    Capable of insight    Able to change behavior   X Progressing to goal    Other:        Comments:

## 2017-10-23 PROCEDURE — 1240000000 HC EMOTIONAL WELLNESS R&B

## 2017-10-23 NOTE — PLAN OF CARE
Problem: Altered Mood, Manic Behavior  Goal: STG-Medication therapy compliance  Outcome: Not Met This Shift  Pt encouraged to take meds, states he will not take meds, pt is calm, cooperative, isolative, and paranoid

## 2017-10-23 NOTE — PLAN OF CARE
Distractible  Thought Processes: Circumstantial  Thought Content:Normal: No  Thought Content: Paranoia, Preoccupations  Hallucinations: None  Delusions: No  Delusions: Persecution  Memory:Normal: Yes  Memory: Poor Recent  Insight and Judgment: No  Insight and Judgment: Poor Insight  Present Suicidal Ideation: No  Present Homicidal Ideation: No    Daily Assessment Last Entry:   Daily Sleep (WDL): Within Defined Limits         Patient Currently in Pain: No  Daily Nutrition (WDL): Within Defined Limits    Patient Monitoring:  Frequency of Checks: 4 times per hour, close    Psychiatric Symptoms:   Depression Symptoms  Depression Symptoms: Loss of interest, Impaired concentration  Anxiety Symptoms  Anxiety Symptoms: Generalized  Mami Symptoms  Mami Symptoms: Rapid cycling     Psychosis Symptoms  Delusion Type: No problems reported or observed., Persecutory    Suicide Risk CSSR-S:  1) Within the past month, have you wished you were dead or wished you could go to sleep and not wake up? : NO  2) Within the past month, have you actually had any thoughts of killing yourself? : NO  6)  Have you ever done anything, started to do anything, or prepared to do anything to end your life?: NO  Change in Result No Change in Plan of care No    EDUCATION:   Learner Progress Toward Treatment Goals: Reviewed results and recommendations of this team, Reviewed group plan and strategies, Reviewed signs, symptoms and risk of self harm and violent behavior, Reviewed goals and plan of care    Method: individual education, small group, verbal education    Outcome: verbalized understanding, but needs reinforcement to obtain goals    PATIENT GOALS:   short term: Pt refused to attend  Long term: Pt refused to attend    PLAN/TREATMENT RECOMMENDATIONS UPDATE:   Continue with group therapies, education of coping skills   Continue to monitor patient on unit. Medications provided/ medication compliance by patient.   Continue for plans to obtain long term goals after discharge.     SHORT-TERM GOALS UPDATE:  Time frame for Short-Term Goals: 8-14days     LONG-TERM GOALS UPDATE:  Time frame for Long-Term Goals: 6 months  Members Present in Team Meeting: See Signature Sheet    Marian Anderson

## 2017-10-23 NOTE — PLAN OF CARE
Problem: Altered Mood, Psychotic Behavior  Goal: LTG-Able to verbalize reality based thinking  Outcome: Ongoing  Pt did not participate in Personal Opinions / Mariano Bare / Communication Skills group at 1330 despite staff encouragement.

## 2017-10-23 NOTE — PLAN OF CARE
Problem: Altered Mood, Psychotic Behavior  Goal: STG-Able to demonstrate trust by eating, participating in treatment and following staff's directions  Outcome: Ongoing  Pt did not participate in Goal Setting / [employer] group at [employer] despite staff encouragement.

## 2017-10-23 NOTE — PLAN OF CARE
Problem: Altered Mood, Psychotic Behavior  Goal: STG-Able to demonstrate trust by eating, participating in treatment and following staff's directions  Outcome: Ongoing  Pt did not participate in recovery/ cognitive skills group at 1430 despite staff encouragement to attend.

## 2017-10-23 NOTE — PLAN OF CARE
Problem: Altered Mood, Psychotic Behavior  Goal: LTG-Able to verbalize reality based thinking  More paranoid tonight stating he may be arrested if he talks to me about why he is here. Aloof unless approached. Continues to refuse med's despite education.

## 2017-10-24 PROCEDURE — 1240000000 HC EMOTIONAL WELLNESS R&B

## 2017-10-24 NOTE — PLAN OF CARE
Problem: Altered Mood, Psychotic Behavior  Goal: STG-Able to demonstrate trust by eating, participating in treatment and following staff's directions  Outcome: Ongoing  Pt did not participate in Cognitive Skills Therapeutic Activity at 1330 despite staff encouragement.

## 2017-10-24 NOTE — PLAN OF CARE
Problem: Altered Mood, Manic Behavior  Goal: STG-Medication therapy compliance  Outcome: Ongoing  Pt refused medications this evening after encouragement from writer.

## 2017-10-24 NOTE — CARE COORDINATION
[facility] reported no available beds on this date and stated pt is currently #3 on wait list     Writer will continue to contact [facility] for pt placement

## 2017-10-24 NOTE — PLAN OF CARE
Problem: Altered Mood, Psychotic Behavior  Goal: STG-Able to demonstrate trust by eating, participating in treatment and following staff's directions  Outcome: Ongoing  Pt does not participate in groups or take meds.  Pt eats and is calm and cooperative

## 2017-10-25 PROCEDURE — 1240000000 HC EMOTIONAL WELLNESS R&B

## 2017-10-25 NOTE — PLAN OF CARE
Problem: Altered Mood, Manic Behavior  Goal: LTG-Mood stable  Outcome: Ongoing  Pt is not offering any info into his treatment.

## 2017-10-25 NOTE — PROGRESS NOTES
Problem: Altered Mood, Manic Behavior  Goal: LTG-Mood stable  Outcome: Ongoing  Pt appears anxious at times but remains controlled and cooperative this shift     Problem: Altered Mood, Psychotic Behavior  Goal: STG-Able to demonstrate trust by eating, participating in treatment and following staff's directions  Outcome: Ongoing  Pt is controlled and cooperative this shift. He sits in the fringe of the milieu aloof of staff and peers but is cooperative when approached. Deb Walters denies SI, HI, and hallucinations at this time but appears preoccupied and paranoid at times.  He accepts a snack, but refuses medication \"I do not need medication, they will have to force it on me\"

## 2017-10-25 NOTE — PROGRESS NOTES
He continues to be quite labile and agitated, wandering around on the unit. Low stress tolerance and irritability, still responding to internal stimuli. .Affect-Superficial  .Mood -   Labile  Thought process -  Poorly productive,disorganized  Thought content-Ideas of reference,delusional  Cognition -  Impaired  Memory- Recent-Poor                Remote-Poor  Orientation-OK                                               Insight-Poor  Judgement-Poor                                              Attempted to develop insight. Medications reviewed. Side effects of medications reviewed and medication education provided. No side effects including akathisia,tremers,dystonia or orthostasis reported or observed. Encouraged to participate and interact with peers. Reassurance and support provided.     Plan: Still refusing medications so attempted to increase insight into need for medications,group therapy and develop coping skills, awaiting bed at the [facility].

## 2017-10-26 PROCEDURE — 1240000000 HC EMOTIONAL WELLNESS R&B

## 2017-10-26 NOTE — PLAN OF CARE
Problem: Altered Mood, Psychotic Behavior  Goal: STG-Able to demonstrate trust by eating, participating in treatment and following staff's directions  Outcome: Ongoing  Pt did not participate in Goal Setting / [employer] group at [address] [place] [address] despite staff encouragement.

## 2017-10-26 NOTE — PLAN OF CARE
Problem: Altered Mood, Psychotic Behavior  Goal: LTG-Able to verbalize reality based thinking  Outcome: Ongoing  Pt did not participate in Therapeutic Leisure Skills Group at 1100 despite staff encouragement.

## 2017-10-26 NOTE — PLAN OF CARE
Problem: Altered Mood, Psychotic Behavior  Goal: STG-Able to demonstrate trust by eating, participating in treatment and following staff's directions  Outcome: Ongoing  Pt did not participate in [id number] [facility] at [phone] despite staff encouragement.

## 2017-10-26 NOTE — PLAN OF CARE
Problem: Altered Mood, Manic Behavior  Goal: LTG-Mood stable  Outcome: Ongoing  Pt did not participate in Problem Solving / Therapeutic Activity / Positive Thinking group at 1330 despite staff encouragement.

## 2017-10-27 PROCEDURE — 1240000000 HC EMOTIONAL WELLNESS R&B

## 2017-10-27 NOTE — PLAN OF CARE
Problem: Altered Mood, Manic Behavior  Goal: STG-Medication therapy compliance  Outcome: Ongoing  Writer discussed medication regimen with pt, pt denies mental health issues, remains withdrawn, paranoid

## 2017-10-27 NOTE — PLAN OF CARE
Problem: Altered Mood, Psychotic Behavior  Goal: LTG-Able to verbalize reality based thinking  Outcome: Ongoing  PT did not attend 10:00 am group despite staff invitation to do so.

## 2017-10-28 PROCEDURE — 1240000000 HC EMOTIONAL WELLNESS R&B

## 2017-10-28 NOTE — PROGRESS NOTES
He is doing about the same, no change in his mental status and continues to be paranoid, and guarded. Overwhelmed with persecutory delusions and hallucinationsStill responding to internal stimuli . Affect-Superficial  Mood -  Agitated and labile  Thought process -  Disorganized showing looseness of association and tangentiality. Reality testing-Impaired  Cognition -  Impaired  Memory- Recent-Impaired                Remote-Impaired  Orientation-Disoriented                                              Insight-Poor  Judgement-Poor                                                Attempted to develop insight. Medications reviewed. Side effects of medications reviewed and medication education provided. No side effects including akathisia,tremers,dystonia or orthostasis reported or observed. Plan: Stabilization with antipsychotic and mood stabilization medications,group therapy and develop coping skills,discharge to community. Lorenso Frankel

## 2017-10-28 NOTE — BH NOTE
Pt ref HS meds stating \"I won't take them until I'm forced to. \" education provided on med compliance.

## 2017-10-28 NOTE — PLAN OF CARE
Problem: Altered Mood, Manic Behavior  Goal: LTG-Mood stable  Outcome: Ongoing  The patient is calm and controlled about the day room. The patient is aloof of peers and isolates to his room for long intervals. The patient paces the halls at times. The patient denies any psychological needs or issues. The patient only relates only to having to see his dentist in the near future. Problem: Altered Mood, Psychotic Behavior  Goal: LTG-Able to verbalize reality based thinking  Outcome: Ongoing  The patient is capable of carrying a reality based conversation. The patient is calm and polite during conversation. 15 minute checks maintained.

## 2017-10-29 PROCEDURE — 1240000000 HC EMOTIONAL WELLNESS R&B

## 2017-10-29 NOTE — PLAN OF CARE
Problem: Altered Mood, Manic Behavior  Goal: LTG-Mood stable  Outcome: Ongoing  Pt did not participate in Goal Setting / [employer] group at 3851 despite staff encouragement.

## 2017-10-29 NOTE — PLAN OF CARE
Problem: Altered Mood, Manic Behavior  Goal: LTG-Mood stable  Outcome: Ongoing  Pt declined to attend psychoeducation at 10:00am despite encouragement from staff.

## 2017-10-30 PROCEDURE — 1240000000 HC EMOTIONAL WELLNESS R&B

## 2017-10-30 NOTE — PROGRESS NOTES
Problem: Altered Mood, Manic Behavior  Goal: LTG-Mood stable  Outcome: Ongoing  Pt appears anxious at times but remains controlled and cooperative this shift     Problem: Altered Mood, Psychotic Behavior  Goal: STG-Able to demonstrate trust by eating, participating in treatment and following staff's directions  Outcome: Ongoing  Pt is controlled and cooperative this shift. He sits in the fringe of the milieu aloof of staff and peers but is cooperative when approached. Bernardine Box denies SI, HI, and hallucinations at this time but appears preoccupied and paranoid at times.  He accepts a snack, but refuses medication \"I do not need medication, they will have to force it on me\"

## 2017-10-30 NOTE — PLAN OF CARE
Problem: Altered Mood, Psychotic Behavior  Goal: LTG-Able to verbalize reality based thinking  Outcome: Ongoing  Pt did not participate in Goal Setting / [employer] group at [employer] despite staff encouragement.

## 2017-10-30 NOTE — PLAN OF CARE
Problem: Altered Mood, Psychotic Behavior  Goal: STG-Able to demonstrate trust by eating, participating in treatment and following staff's directions  Outcome: Ongoing  Pt did not participate in Therapeutic Leisure Skills Group at 1100 despite staff encouragement.

## 2017-10-30 NOTE — PLAN OF CARE
Problem: Altered Mood, Psychotic Behavior  Goal: LTG-Able to verbalize reality based thinking  Outcome: Ongoing  Pt did not participate in Personal Opinions / Social Skills / Leisure Skills and Awareness group at [address] despite staff encouragement.

## 2017-10-30 NOTE — PROGRESS NOTES
He is doing about the same, superficial and labile, refusing to participate in any treatment though still guarded and responding to internal stimuli. Affect-Superficial  Mood -  labile but improving  Thought process -  Slightly better organized but still showing looseness of association and tangentiality. Reality testing-Impaired  Cognition -  Intact  Memory- Recent-Intact                Remote-Intact  Orientation-Oriented to time ,place and person. Insight-Poor  Judgement-Superficial                                              Attempted to develop insight and improve reality testing. Medications reviewed. Side effects of medications reviewed and medication education provided  No side effects including akathisia,tremers,dystonia or orthostasis reported or observed. Plan: Stabilization with antipsychotic and mood stabilization medications,group therapy and develop coping skills,discharge to community. Encouraged to interact with peers  and participate in activities. Arlet Floyd

## 2017-10-30 NOTE — PLAN OF CARE
Problem: Altered Mood, Psychotic Behavior  Goal: LTG-Able to verbalize reality based thinking  Outcome: Ongoing  PT did not attend 10:0 am Psychoeducational group despite encouragement to do so.

## 2017-10-30 NOTE — PLAN OF CARE
after discharge.     SHORT-TERM GOALS UPDATE:  Time frame for Short-Term Goals:  8-14days     LONG-TERM GOALS UPDATE:  Time frame for Long-Term Goals:  6 months    Members Present in Team Meeting:   See signature sheet  VIVIANA Floyd      Problem: Altered Mood, Psychotic Behavior  Goal: STG-Able to demonstrate trust by eating, participating in treatment and following staff's directions  Outcome: Ongoing    Goal: LTG-Able to verbalize reality based thinking  Outcome: Ongoing

## 2017-10-30 NOTE — PLAN OF CARE
Problem: Altered Mood, Manic Behavior  Goal: LTG-Mood stable  Outcome: Ongoing  Pt continues to be manic, such as pacing the halls of the unit, pt is in behavioral control. Problem: Altered Mood, Psychotic Behavior  Goal: STG-Able to demonstrate trust by eating, participating in treatment and following staff's directions  Outcome: Ongoing  Pt has no problems with eating all of his meals.

## 2017-10-31 PROCEDURE — 1240000000 HC EMOTIONAL WELLNESS R&B

## 2017-10-31 NOTE — PLAN OF CARE
Problem: Altered Mood, Psychotic Behavior  Goal: LTG-Able to verbalize reality based thinking  Continues to be paranoid w/persecetory delusions.

## 2017-10-31 NOTE — PLAN OF CARE
Problem: Altered Mood, Manic Behavior  Goal: LTG-Mood stable  Controlled but continues to refuse med's denying need for tx.

## 2017-10-31 NOTE — PLAN OF CARE
Problem: Altered Mood, Psychotic Behavior  Goal: LTG-Able to verbalize reality based thinking  Outcome: Ongoing  Pt did not participate in cognitive skills group at 1330 despite staff encouragement to attend.

## 2017-11-01 VITALS
BODY MASS INDEX: 26.31 KG/M2 | TEMPERATURE: 98.2 F | SYSTOLIC BLOOD PRESSURE: 143 MMHG | HEIGHT: 74 IN | RESPIRATION RATE: 12 BRPM | OXYGEN SATURATION: 100 % | WEIGHT: 205 LBS | DIASTOLIC BLOOD PRESSURE: 81 MMHG | HEART RATE: 76 BPM

## 2017-11-01 PROCEDURE — 1240000000 HC EMOTIONAL WELLNESS R&B

## 2017-11-01 NOTE — PLAN OF CARE
Problem: Altered Mood, Manic Behavior  Goal: LTG-Mood stable  Outcome: Ongoing  Pt did not participate in Leisure Skills and Awareness / Social Skills / Therapeutic Activity group at 1430 despite staff encouragement.

## 2017-11-01 NOTE — PLAN OF CARE
Problem: Altered Mood, Manic Behavior  Goal: LTG-Mood stable  Outcome: Ongoing  Pt did not participate in Goal Setting / [employer] group at 0383 despite staff encouragement.

## 2017-11-01 NOTE — PLAN OF CARE
Problem: Altered Mood, Manic Behavior  Goal: STG-Medication therapy compliance  Outcome: Ongoing  Pt denies need for psych meds, remains paranoid. Will continue to reinforce benefits of med compliance.

## 2017-11-01 NOTE — PROGRESS NOTES
He continues to be responding to internal stimuli, isolating self and doesn't have any motivation or insight. Thought process is disorganized and poorly productive. Psychomotor activity is retarded and has no motivation . Affect-Superficial  Mood -  Agitated and labile  Thought process -  Disorganized showing looseness of association and tangentiality. Reality testing-Impaired  Cognition -  Impaired  Memory- Recent-Impaired                Remote-Impaired  Orientation-Disoriented                                              Insight-Poor  Judgement-Poor     Encouraged to participate and interact with peers. Attempted to develop insight. Medications reviewed. Side effects of medications reviewed and medication education provided. No side effects including akathisia,tremers,dystonia or orthostasis reported or observed. Plan: Still refusing medications so we will continue to attempt to develop insight into need for treatment,group therapy and develop coping skills,discharge to community. Encouraged to interact with peers  and participate in activities.

## 2017-11-01 NOTE — PLAN OF CARE
Problem: Altered Mood, Psychotic Behavior  Goal: LTG-Able to verbalize reality based thinking  Outcome: Ongoing  Pt did not participate in leisure group at 1100 despite staff encouragement to attend.

## 2017-11-01 NOTE — PROGRESS NOTES
Problem: Altered Mood, Manic Behavior  Goal: LTG-Mood stable  Outcome: Ongoing  Pt appears anxious at times but remains controlled and cooperative this shift     Problem: Altered Mood, Psychotic Behavior  Goal: STG-Able to demonstrate trust by eating, participating in treatment and following staff's directions  Outcome: Ongoing  Pt is controlled and cooperative this shift. He sits in the fringe of the milieu aloof of staff and peers but is cooperative when approached. Pernell Claudia denies SI, HI, and hallucinations at this time but appears preoccupied and paranoid at times.  He accepts a snack, but refuses medication \"I do not need medication, they will have to force it on me\"

## 2017-11-02 NOTE — PROGRESS NOTES
Patient is quite  psychotic, agitated, paranoid, and guarded. Overwhelmed with persecutory delusions and hallucinationsStill responding to internal stimuli . Affect-Superficial  Mood -  Agitated and labile  Thought process -  Disorganized showing looseness of association and tangentiality. Reality testing-Impaired  Cognition -  Impaired  Memory- Recent-Impaired                Remote-Impaired  Orientation-Disoriented                                              Insight-Poor  Judgement-Poor                                                Attempted to develop insight. Medications reviewed. Side effects of medications reviewed and medication education provided. No side effects including akathisia,tremers,dystonia or orthostasis reported or observed. Plan: Still refusing medications so we'll continue to develop insight into need for treatment, Stabilization with antipsychotic and mood stabilization medications,group therapy and develop coping skills,discharge to community.   . Still awaiting a bed at the [facility]

## 2017-11-02 NOTE — PLAN OF CARE
Problem: Altered Mood, Psychotic Behavior  Goal: LTG-Able to verbalize reality based thinking  Outcome: Ongoing  Pt did not participate in cognitive skills/ social interaction/ values group at 1330 despite staff encouragement to attend.

## 2017-11-02 NOTE — PROGRESS NOTES
Problem: Altered Mood, Manic Behavior  Goal: LTG-Mood stable  Outcome: Ongoing  Pt appears anxious irritable and uncooperative. He paces between his room and the clock. He challenges unit rules and tells staff that they are lying at him \"It has never been this way before\".      Problem: Altered Mood, Psychotic Behavior  Goal: STG-Able to demonstrate trust by eating, participating in treatment and following staff's directions  Outcome: Ongoing  Pt is seclusive to his room out for needs only. [facility] denies SI, HI, and hallucinations at this time but appears preoccupied and paranoid at times. He postures, gestures to things that do not appear to be there, and engages in self talk.  He does not accept a snack and refuses medication \"I'm offended that you think I need drugs\"

## 2017-11-02 NOTE — BH NOTE
[facility]  Discharge Note    Pt discharged with followings belongings:   Dentures: None  Vision - Corrective Lenses: None  Hearing Aid: None  Jewelry: None  Body Piercings Removed: N/A  Clothing: Footwear, Pants, Shirt, Undergarments (Comment)  Were All Patient Medications Collected?: Not Applicable  Other Valuables: John Abdi sent home with (in security custody while transported to [facility]). Valuables retrieved from [place] envelope number:  [id number] and sent with security to transfer patient. Patient left department with Departure Mode: In police custody (transported to [facility] ) via Mobility at Departure: Ambulatory, discharged to Discharged to: Other (Comment) ([address] ). Patient education on aftercare instructions: completed  Information sent to [facility] with patient by RN Patient verbalize understanding of AVS:  Yes.  Patient aware he is being transferred to [place].    Status EXAM upon discharge:  Status and Exam  Normal: No  Facial Expression: Avoids Gaze  Affect: Blunt  Level of Consciousness: Alert  Mood:Normal: No  Mood: Anxious, Suspicious  Motor Activity:Normal: Yes  Motor Activity: Increased, Repetitive Acts  Interview Behavior: Evasive  Preception: [place] to Person, Elizabeth Husk to Time, [place] to Place  Attention:Normal: No  Attention: Distractible  Thought Processes: Blocking  Thought Content:Normal: No  Thought Content: Poverty of Content, Preoccupations  Hallucinations:  (denies )  Delusions: No  Delusions: Grandeur, Persecution  Memory:Normal: No  Memory: Confabulation  Insight and Judgment: No  Insight and Judgment: Unmotivated  Present Suicidal Ideation: No  Present Homicidal Ideation: No    Anastasiia Hutchinson RN

## 2017-11-02 NOTE — PLAN OF CARE
Problem: Altered Mood, Psychotic Behavior  Goal: LTG-Able to verbalize reality based thinking  Outcome: Ongoing  Pt did not participate in community meeting/ goals group at [address] [place] [address] despite staff encouragement to attend.

## 2018-06-11 ENCOUNTER — HOSPITAL ENCOUNTER (EMERGENCY)
Age: 38
Discharge: PSYCHIATRIC OR SUBSTANCE USE FACILITY | End: 2018-06-12
Attending: FAMILY MEDICINE
Payer: COMMERCIAL

## 2018-06-11 ENCOUNTER — PREP FOR PROCEDURE (OUTPATIENT)
Dept: FAMILY MEDICINE CLINIC | Age: 38
End: 2018-06-11

## 2018-06-11 DIAGNOSIS — R45.850 HOMICIDAL IDEATIONS: ICD-10-CM

## 2018-06-11 DIAGNOSIS — F20.0 PARANOID SCHIZOPHRENIA (HCC): Primary | ICD-10-CM

## 2018-06-11 DIAGNOSIS — Z91.14 NON COMPLIANCE W MEDICATION REGIMEN: ICD-10-CM

## 2018-06-11 LAB
-: ABNORMAL
ABSOLUTE EOS #: 0.1 K/UL (ref 0–0.4)
ABSOLUTE IMMATURE GRANULOCYTE: NORMAL K/UL (ref 0–0.3)
ABSOLUTE LYMPH #: 1.7 K/UL (ref 1–4.8)
ABSOLUTE MONO #: 0.5 K/UL (ref 0–1)
ACETAMINOPHEN LEVEL: <5 UG/ML (ref 10–30)
ALBUMIN SERPL-MCNC: 5.1 G/DL (ref 3.5–5.2)
ALBUMIN/GLOBULIN RATIO: ABNORMAL (ref 1–2.5)
ALP BLD-CCNC: 55 U/L (ref 40–129)
ALT SERPL-CCNC: 8 U/L (ref 5–41)
AMORPHOUS: ABNORMAL
AMPHETAMINE SCREEN URINE: NEGATIVE
ANION GAP SERPL CALCULATED.3IONS-SCNC: 12 MMOL/L (ref 9–17)
AST SERPL-CCNC: 14 U/L
BACTERIA: ABNORMAL
BARBITURATE SCREEN URINE: NEGATIVE
BASOPHILS # BLD: 1 % (ref 0–2)
BASOPHILS ABSOLUTE: 0 K/UL (ref 0–0.2)
BENZODIAZEPINE SCREEN, URINE: NEGATIVE
BILIRUB SERPL-MCNC: 0.75 MG/DL (ref 0.3–1.2)
BILIRUBIN URINE: NEGATIVE
BUN BLDV-MCNC: 8 MG/DL (ref 6–20)
BUN/CREAT BLD: 8 (ref 9–20)
BUPRENORPHINE URINE: NORMAL
CALCIUM SERPL-MCNC: 9.9 MG/DL (ref 8.6–10.4)
CANNABINOID SCREEN URINE: NEGATIVE
CASTS UA: ABNORMAL /LPF
CHLORIDE BLD-SCNC: 99 MMOL/L (ref 98–107)
CO2: 26 MMOL/L (ref 20–31)
COCAINE METABOLITE, URINE: NEGATIVE
COLOR: YELLOW
COMMENT UA: ABNORMAL
CREAT SERPL-MCNC: 1.02 MG/DL (ref 0.7–1.2)
CRYSTALS, UA: ABNORMAL /HPF
DIFFERENTIAL TYPE: YES
EOSINOPHILS RELATIVE PERCENT: 2 % (ref 0–5)
EPITHELIAL CELLS UA: ABNORMAL /HPF
ETHANOL PERCENT: <0.01 %
ETHANOL: <10 MG/DL
GFR AFRICAN AMERICAN: >60 ML/MIN
GFR NON-AFRICAN AMERICAN: >60 ML/MIN
GFR SERPL CREATININE-BSD FRML MDRD: ABNORMAL ML/MIN/{1.73_M2}
GFR SERPL CREATININE-BSD FRML MDRD: ABNORMAL ML/MIN/{1.73_M2}
GLUCOSE BLD-MCNC: 116 MG/DL (ref 70–99)
GLUCOSE URINE: NEGATIVE
HCT VFR BLD CALC: 48 % (ref 41–53)
HEMOGLOBIN: 15.6 G/DL (ref 13.5–17.5)
IMMATURE GRANULOCYTES: NORMAL %
KETONES, URINE: NEGATIVE
LEUKOCYTE ESTERASE, URINE: NEGATIVE
LYMPHOCYTES # BLD: 28 % (ref 13–44)
MCH RBC QN AUTO: 28.1 PG (ref 26–34)
MCHC RBC AUTO-ENTMCNC: 32.5 G/DL (ref 31–37)
MCV RBC AUTO: 86.5 FL (ref 80–100)
MDMA URINE: NORMAL
METHADONE SCREEN, URINE: NEGATIVE
METHAMPHETAMINE, URINE: NEGATIVE
MONOCYTES # BLD: 8 % (ref 5–9)
MUCUS: ABNORMAL
NITRITE, URINE: NEGATIVE
NRBC AUTOMATED: NORMAL PER 100 WBC
OPIATES, URINE: NEGATIVE
OTHER OBSERVATIONS UA: ABNORMAL
OXYCODONE SCREEN URINE: NEGATIVE
PDW BLD-RTO: 13.3 % (ref 12.1–15.2)
PH UA: 6 (ref 5–8)
PHENCYCLIDINE, URINE: NEGATIVE
PLATELET # BLD: 307 K/UL (ref 140–450)
PLATELET ESTIMATE: NORMAL
PMV BLD AUTO: NORMAL FL (ref 6–12)
POTASSIUM SERPL-SCNC: 4.2 MMOL/L (ref 3.7–5.3)
PROPOXYPHENE, URINE: NEGATIVE
PROTEIN UA: NEGATIVE
RBC # BLD: 5.55 M/UL (ref 4.5–5.9)
RBC # BLD: NORMAL 10*6/UL
RBC UA: ABNORMAL /HPF (ref 0–2)
RENAL EPITHELIAL, UA: ABNORMAL /HPF
SALICYLATE LEVEL: <1 MG/DL (ref 3–10)
SEG NEUTROPHILS: 61 % (ref 39–75)
SEGMENTED NEUTROPHILS ABSOLUTE COUNT: 3.7 K/UL (ref 2.1–6.5)
SODIUM BLD-SCNC: 137 MMOL/L (ref 135–144)
SPECIFIC GRAVITY UA: 1.01 (ref 1–1.03)
TEST INFORMATION: NORMAL
TOTAL PROTEIN: 8.1 G/DL (ref 6.4–8.3)
TRICHOMONAS: ABNORMAL
TRICYCLIC ANTIDEPRESSANTS, UR: NEGATIVE
TURBIDITY: CLEAR
URINE HGB: NEGATIVE
UROBILINOGEN, URINE: NORMAL
WBC # BLD: 6.1 K/UL (ref 3.5–11)
WBC # BLD: NORMAL 10*3/UL
WBC UA: ABNORMAL /HPF
YEAST: ABNORMAL

## 2018-06-11 PROCEDURE — G0480 DRUG TEST DEF 1-7 CLASSES: HCPCS

## 2018-06-11 PROCEDURE — 81001 URINALYSIS AUTO W/SCOPE: CPT

## 2018-06-11 PROCEDURE — 99285 EMERGENCY DEPT VISIT HI MDM: CPT

## 2018-06-11 PROCEDURE — 90792 PSYCH DIAG EVAL W/MED SRVCS: CPT | Performed by: PSYCHIATRY & NEUROLOGY

## 2018-06-11 PROCEDURE — 80053 COMPREHEN METABOLIC PANEL: CPT

## 2018-06-11 PROCEDURE — 80307 DRUG TEST PRSMV CHEM ANLYZR: CPT

## 2018-06-11 PROCEDURE — 85025 COMPLETE CBC W/AUTO DIFF WBC: CPT

## 2018-06-11 PROCEDURE — 80306 DRUG TEST PRSMV INSTRMNT: CPT

## 2018-06-11 RX ORDER — HALOPERIDOL 5 MG/ML
5 INJECTION INTRAMUSCULAR ONCE
Status: DISCONTINUED | OUTPATIENT
Start: 2018-06-11 | End: 2018-06-12 | Stop reason: HOSPADM

## 2018-06-11 RX ORDER — LORAZEPAM 2 MG/ML
4 INJECTION INTRAMUSCULAR ONCE
Status: DISCONTINUED | OUTPATIENT
Start: 2018-06-11 | End: 2018-06-12 | Stop reason: HOSPADM

## 2018-06-12 VITALS
SYSTOLIC BLOOD PRESSURE: 127 MMHG | OXYGEN SATURATION: 100 % | WEIGHT: 179 LBS | DIASTOLIC BLOOD PRESSURE: 82 MMHG | HEART RATE: 77 BPM | TEMPERATURE: 98.6 F | BODY MASS INDEX: 22.26 KG/M2 | HEIGHT: 75 IN | RESPIRATION RATE: 15 BRPM

## 2024-07-26 PROCEDURE — RXMED WILLOW AMBULATORY MEDICATION CHARGE

## 2024-07-29 ENCOUNTER — PHARMACY VISIT (OUTPATIENT)
Dept: PHARMACY | Facility: CLINIC | Age: 44
End: 2024-07-29
Payer: COMMERCIAL

## 2024-07-29 PROCEDURE — RXMED WILLOW AMBULATORY MEDICATION CHARGE

## 2024-07-29 PROCEDURE — RXOTC WILLOW AMBULATORY OTC CHARGE
